# Patient Record
Sex: MALE | Race: WHITE | NOT HISPANIC OR LATINO
[De-identification: names, ages, dates, MRNs, and addresses within clinical notes are randomized per-mention and may not be internally consistent; named-entity substitution may affect disease eponyms.]

---

## 2020-06-11 PROBLEM — Z00.00 ENCOUNTER FOR PREVENTIVE HEALTH EXAMINATION: Status: ACTIVE | Noted: 2020-06-11

## 2020-07-15 ENCOUNTER — APPOINTMENT (OUTPATIENT)
Dept: UROLOGY | Facility: CLINIC | Age: 20
End: 2020-07-15
Payer: COMMERCIAL

## 2020-07-15 VITALS
SYSTOLIC BLOOD PRESSURE: 91 MMHG | WEIGHT: 170 LBS | RESPIRATION RATE: 16 BRPM | HEIGHT: 74.5 IN | BODY MASS INDEX: 21.59 KG/M2 | DIASTOLIC BLOOD PRESSURE: 48 MMHG | HEART RATE: 51 BPM

## 2020-07-15 VITALS — TEMPERATURE: 98 F

## 2020-07-15 DIAGNOSIS — Z84.1 FAMILY HISTORY OF DISORDERS OF KIDNEY AND URETER: ICD-10-CM

## 2020-07-15 DIAGNOSIS — Z87.891 PERSONAL HISTORY OF NICOTINE DEPENDENCE: ICD-10-CM

## 2020-07-15 DIAGNOSIS — Z72.89 OTHER PROBLEMS RELATED TO LIFESTYLE: ICD-10-CM

## 2020-07-15 PROCEDURE — 99204 OFFICE O/P NEW MOD 45 MIN: CPT

## 2020-07-15 RX ORDER — LORATADINE 10 MG
TABLET,DISINTEGRATING ORAL
Refills: 0 | Status: ACTIVE | COMMUNITY

## 2020-07-15 NOTE — PHYSICAL EXAM
[General Appearance - Well Developed] : well developed [Heart Rate And Rhythm] : Heart rate and rhythm were normal [Abdomen Soft] : soft [No Focal Deficits] : no focal deficits [Normal Station and Gait] : the gait and station were normal for the patient's age [Oriented To Time, Place, And Person] : oriented to person, place, and time [Urethral Meatus] : meatus normal [Penis Abnormality] : normal circumcised penis [Urinary Bladder Findings] : the bladder was normal on palpation [Epididymis] : the epididymides were normal [Scrotum] : the scrotum was normal [Anus Abnormality] : the anus and perineum were normal [Testes Mass (___cm)] : there were no testicular masses [] : no rash [No Palpable Adenopathy] : no palpable adenopathy [FreeTextEntry1] : pt unsure- some sensitivity over distal urethra

## 2020-07-15 NOTE — HISTORY OF PRESENT ILLNESS
[Urinary Frequency] : urinary frequency [Urinary Urgency] : urinary urgency [Intermittency] : intermittency [Post-Void Dribbling] : post-void dribbling [None] : None [Bladder Spasm] : bladder spasm [FreeTextEntry1] : 19 yr old male presents to establish care. Pt complains of frequency/urgency every hour. Pt complains of constipation/IBS, on miralax and citrucell, daily regular BM. Pt states that during the nighttime he can spend hours on the toilet voiding only a small amount several times. Pt noticed urinary dysfunction with "double stream" at age 11. Feels sig urge and frequency, though sometimes will void well without issues.  Feels some relief once urinated.  Always worsens with sig constipation- worse with BM's, and when bowels doing well, much less in the way of urinary symptoms.\par \par Surgical hx: umbilical hernia at age 1 \par Medical hx: constipation \par Allergies: none\par Social: Alcohol-no, Smoking none-, Drug-none, Occupation- college \par Family hx: brother UPJ obstruction, granfather- kidney stones, sibling- urinary reflex\par Medications: miralax and citrucell \par \par  [Dysuria] : no dysuria [Hematuria - Gross] : no gross hematuria

## 2020-07-15 NOTE — ASSESSMENT
[FreeTextEntry1] : PVR-0 ml \par \par PE today unremarkable.\par \par Plan\par 1) renal/bladder US\par 2) u/a, C&S today\par 3) refer to Dr. Oro for eval\par 4) cont to work on IBS, constipation

## 2020-07-16 LAB
APPEARANCE: CLEAR
BACTERIA: NEGATIVE
BILIRUBIN URINE: NEGATIVE
BLOOD URINE: NEGATIVE
COLOR: NORMAL
GLUCOSE QUALITATIVE U: NEGATIVE
HYALINE CASTS: 0 /LPF
KETONES URINE: NEGATIVE
LEUKOCYTE ESTERASE URINE: NEGATIVE
MICROSCOPIC-UA: NORMAL
NITRITE URINE: NEGATIVE
PH URINE: 6.5
PROTEIN URINE: NORMAL
RED BLOOD CELLS URINE: 1 /HPF
SPECIFIC GRAVITY URINE: 1.01
SQUAMOUS EPITHELIAL CELLS: 0 /HPF
UROBILINOGEN URINE: NORMAL
WHITE BLOOD CELLS URINE: 0 /HPF

## 2020-07-17 LAB — BACTERIA UR CULT: NORMAL

## 2020-08-25 ENCOUNTER — APPOINTMENT (OUTPATIENT)
Dept: ULTRASOUND IMAGING | Facility: IMAGING CENTER | Age: 20
End: 2020-08-25
Payer: COMMERCIAL

## 2020-08-25 ENCOUNTER — OUTPATIENT (OUTPATIENT)
Dept: OUTPATIENT SERVICES | Facility: HOSPITAL | Age: 20
LOS: 1 days | End: 2020-08-25
Payer: COMMERCIAL

## 2020-08-25 ENCOUNTER — APPOINTMENT (OUTPATIENT)
Dept: UROLOGY | Facility: CLINIC | Age: 20
End: 2020-08-25

## 2020-08-25 ENCOUNTER — RESULT REVIEW (OUTPATIENT)
Age: 20
End: 2020-08-25

## 2020-08-25 VITALS — TEMPERATURE: 97.9 F

## 2020-08-25 DIAGNOSIS — R39.0 EXTRAVASATION OF URINE: ICD-10-CM

## 2020-08-25 DIAGNOSIS — R39.15 URGENCY OF URINATION: ICD-10-CM

## 2020-08-25 PROCEDURE — 76770 US EXAM ABDO BACK WALL COMP: CPT | Mod: 26

## 2020-08-25 PROCEDURE — 76770 US EXAM ABDO BACK WALL COMP: CPT

## 2020-10-22 ENCOUNTER — APPOINTMENT (OUTPATIENT)
Dept: UROLOGY | Facility: CLINIC | Age: 20
End: 2020-10-22

## 2021-01-04 ENCOUNTER — APPOINTMENT (OUTPATIENT)
Dept: UROLOGY | Facility: CLINIC | Age: 21
End: 2021-01-04
Payer: COMMERCIAL

## 2021-01-04 PROCEDURE — 99072 ADDL SUPL MATRL&STAF TM PHE: CPT

## 2021-01-04 PROCEDURE — 99214 OFFICE O/P EST MOD 30 MIN: CPT

## 2021-01-04 NOTE — ASSESSMENT
[FreeTextEntry1] : Pelvic Floor Dysfunction \par Writings and drawings given to patient. Discussed with patient no pushing for urination, avoiding constipation, avoiding tightening of pelvic floor muscles. Take warm baths > 15 minutes 2x/day, drink fluids, flax seed oil capsules for constipation 1-2 capsules/day. Can consider physical therapy in the future.

## 2021-01-04 NOTE — HISTORY OF PRESENT ILLNESS
[Urinary Urgency] : urinary urgency [Urinary Frequency] : urinary frequency [None] : None [FreeTextEntry1] : Baljeet is a 21 yo who presents with urgency and frequency for 9 years. Has a hx of IBS-constipation predominant, currently on Miralax and Citrucell. Nocturia 0x. Daytime voids: every 1 hour.Has not tried medication for this. Also reports that he has noticed 'double stream' since age 11. \par \par Renal ultrasound 08/2020: unremarkable; PVR: 30cc \par \par Fluids: 3 bottles of water  \par \par PMHx: IBS - constipation predominant, hx of mesenteric adenitis \par PSHx: umbilical hernia repair, "urinary opening" surgery (possibly hypospadias surgery) at age 1 \par SHx: occassional tobacco use (vape), no alcohol or drug use  [Nocturia] : no nocturia [Weak Stream] : no weak stream [Dysuria] : no dysuria [Hematuria - Gross] : no gross hematuria [Flank Pain] : no flank pain [Weight Loss] : no recent ~M [unfilled] weight loss

## 2021-01-05 LAB
APPEARANCE: CLEAR
BACTERIA: NEGATIVE
BILIRUBIN URINE: NEGATIVE
BLOOD URINE: NEGATIVE
COLOR: NORMAL
GLUCOSE QUALITATIVE U: NEGATIVE
HYALINE CASTS: 0 /LPF
KETONES URINE: NEGATIVE
LEUKOCYTE ESTERASE URINE: NEGATIVE
MICROSCOPIC-UA: NORMAL
NITRITE URINE: NEGATIVE
PH URINE: 7.5
PROTEIN URINE: NEGATIVE
RED BLOOD CELLS URINE: 1 /HPF
SPECIFIC GRAVITY URINE: 1.01
SQUAMOUS EPITHELIAL CELLS: 0 /HPF
UROBILINOGEN URINE: NORMAL
WHITE BLOOD CELLS URINE: 0 /HPF

## 2021-01-06 LAB — BACTERIA UR CULT: NORMAL

## 2021-04-26 ENCOUNTER — APPOINTMENT (OUTPATIENT)
Dept: UROLOGY | Facility: CLINIC | Age: 21
End: 2021-04-26
Payer: COMMERCIAL

## 2021-04-26 ENCOUNTER — APPOINTMENT (OUTPATIENT)
Dept: UROLOGY | Facility: CLINIC | Age: 21
End: 2021-04-26

## 2021-04-26 PROCEDURE — 99072 ADDL SUPL MATRL&STAF TM PHE: CPT

## 2021-04-26 PROCEDURE — 99215 OFFICE O/P EST HI 40 MIN: CPT

## 2021-04-27 LAB
APPEARANCE: CLEAR
BACTERIA: NEGATIVE
BILIRUBIN URINE: NEGATIVE
BLOOD URINE: NEGATIVE
COLOR: COLORLESS
GLUCOSE QUALITATIVE U: NEGATIVE
HYALINE CASTS: 0 /LPF
KETONES URINE: NEGATIVE
LEUKOCYTE ESTERASE URINE: NEGATIVE
MICROSCOPIC-UA: NORMAL
NITRITE URINE: NEGATIVE
PH URINE: 7
PROTEIN URINE: NEGATIVE
RED BLOOD CELLS URINE: 0 /HPF
SPECIFIC GRAVITY URINE: 1.01
SQUAMOUS EPITHELIAL CELLS: 0 /HPF
UROBILINOGEN URINE: NORMAL
WHITE BLOOD CELLS URINE: 0 /HPF

## 2021-06-17 ENCOUNTER — APPOINTMENT (OUTPATIENT)
Dept: UROLOGY | Facility: CLINIC | Age: 21
End: 2021-06-17

## 2021-06-18 ENCOUNTER — APPOINTMENT (OUTPATIENT)
Dept: UROLOGY | Facility: CLINIC | Age: 21
End: 2021-06-18
Payer: COMMERCIAL

## 2021-06-18 VITALS
SYSTOLIC BLOOD PRESSURE: 122 MMHG | RESPIRATION RATE: 16 BRPM | TEMPERATURE: 97.9 F | DIASTOLIC BLOOD PRESSURE: 73 MMHG | WEIGHT: 170 LBS | HEART RATE: 49 BPM | HEIGHT: 75 IN | BODY MASS INDEX: 21.14 KG/M2

## 2021-06-18 DIAGNOSIS — R39.15 URGENCY OF URINATION: ICD-10-CM

## 2021-06-18 DIAGNOSIS — R35.0 FREQUENCY OF MICTURITION: ICD-10-CM

## 2021-06-18 PROCEDURE — 99072 ADDL SUPL MATRL&STAF TM PHE: CPT

## 2021-06-18 PROCEDURE — 99215 OFFICE O/P EST HI 40 MIN: CPT

## 2021-06-18 RX ORDER — ALFUZOSIN HYDROCHLORIDE 10 MG/1
10 TABLET, EXTENDED RELEASE ORAL
Qty: 30 | Refills: 3 | Status: DISCONTINUED | COMMUNITY
Start: 2021-04-26 | End: 2021-06-18

## 2021-06-18 RX ORDER — METHOCARBAMOL 500 MG/1
500 TABLET, FILM COATED ORAL 3 TIMES DAILY
Qty: 90 | Refills: 4 | Status: DISCONTINUED | COMMUNITY
Start: 2021-01-04 | End: 2021-06-18

## 2021-08-13 ENCOUNTER — APPOINTMENT (OUTPATIENT)
Dept: UROLOGY | Facility: CLINIC | Age: 21
End: 2021-08-13

## 2021-09-17 ENCOUNTER — NON-APPOINTMENT (OUTPATIENT)
Age: 21
End: 2021-09-17

## 2021-11-19 ENCOUNTER — APPOINTMENT (OUTPATIENT)
Dept: UROLOGY | Facility: CLINIC | Age: 21
End: 2021-11-19

## 2023-02-24 ENCOUNTER — APPOINTMENT (OUTPATIENT)
Dept: UROLOGY | Facility: CLINIC | Age: 23
End: 2023-02-24

## 2024-02-09 ENCOUNTER — APPOINTMENT (OUTPATIENT)
Dept: UROLOGY | Facility: CLINIC | Age: 24
End: 2024-02-09
Payer: COMMERCIAL

## 2024-02-09 DIAGNOSIS — M62.89 OTHER SPECIFIED DISORDERS OF MUSCLE: ICD-10-CM

## 2024-02-09 DIAGNOSIS — R33.9 RETENTION OF URINE, UNSPECIFIED: ICD-10-CM

## 2024-02-09 PROCEDURE — 99214 OFFICE O/P EST MOD 30 MIN: CPT

## 2024-02-09 RX ORDER — BACLOFEN 10 MG/1
10 TABLET ORAL 3 TIMES DAILY
Qty: 90 | Refills: 3 | Status: ACTIVE | COMMUNITY
Start: 2021-06-18 | End: 1900-01-01

## 2024-02-09 NOTE — HISTORY OF PRESENT ILLNESS
[FreeTextEntry1] : Was a pleasure to see Baljeet once again.  The patient was previously seen back in 2021 with a presumptive diagnosis of pelvic floor dysfunction.  At the time the patient was first evaluated, he had a 9-year history of urgency and frequency of urination as well as IBS -C being somewhat controlled with MiraLAX and Citrucel.  The patient also has a history of a double stream since age 11.  Prior renal ultrasound from 2020 showed no abnormalities and PVR was have been minimal.  A presumptive diagnosis of pelvic floor dysfunction was made and techniques of pelvic floor relaxation were suggested but patient has not been particularly compliant with any recommendations.  He did try a course of alfuzosin and became lightheaded right away and discontinued.  The patient had some benefit with the use of Robaxin for short interval and was ultimately switched over to baclofen but never began the medication.  Today, the patient presents with no change in symptoms but admits that he has not proceeded with any of the recommended therapies including basic techniques of pelvic floor relaxation, specialized physical therapy, along with skeletal muscle relaxants.  All the patient's urinary to systems persist, he also has been having continued difficulties with bowel dysfunction which I feel most likely interrelated.  Patient feels that he will be committed to moving forward with pelvic floor management but also will be getting  next week and likely going back and forth to [facility] over the next several years.  I therefore strongly suggested that he move forward as quickly as possible with recommended therapy, particularly physical therapy.  I also gave him another prescription for baclofen dosing 10 mg 3 times daily after discussing associated risks and benefits.  The patient has another appointment with me in March and will hopefully be returning to discuss a game plan moving forward.

## 2024-03-06 ENCOUNTER — APPOINTMENT (OUTPATIENT)
Dept: UROLOGY | Facility: CLINIC | Age: 24
End: 2024-03-06

## 2024-08-08 ENCOUNTER — NON-APPOINTMENT (OUTPATIENT)
Age: 24
End: 2024-08-08